# Patient Record
(demographics unavailable — no encounter records)

---

## 2024-12-12 NOTE — PHYSICAL EXAM
[General Appearance - Alert] : alert [Auscultation Breath Sounds / Voice Sounds] : lungs were clear to auscultation bilaterally [Heart Sounds] : normal S1 and S2 [] : no rash [No Focal Deficits] : no focal deficits [Oriented To Time, Place, And Person] : oriented to person, place, and time [Musculoskeletal - Swelling] : no joint swelling seen [Motor Tone] : muscle strength and tone were normal

## 2024-12-12 NOTE — HISTORY OF PRESENT ILLNESS
[___ Month(s) Ago] : [unfilled] month(s) ago [FreeTextEntry1] : doing well no constitutional symptoms mild cold symptoms off prednisone pulm visit  feb 2025 no significant sicca symptoms